# Patient Record
Sex: FEMALE | Race: WHITE | NOT HISPANIC OR LATINO
[De-identification: names, ages, dates, MRNs, and addresses within clinical notes are randomized per-mention and may not be internally consistent; named-entity substitution may affect disease eponyms.]

---

## 2017-12-07 ENCOUNTER — APPOINTMENT (OUTPATIENT)
Dept: OBGYN | Facility: CLINIC | Age: 62
End: 2017-12-07
Payer: COMMERCIAL

## 2017-12-07 VITALS
DIASTOLIC BLOOD PRESSURE: 76 MMHG | HEIGHT: 63 IN | WEIGHT: 164 LBS | SYSTOLIC BLOOD PRESSURE: 126 MMHG | BODY MASS INDEX: 29.06 KG/M2

## 2017-12-07 LAB
DATE COLLECTED: NORMAL
HEMOCCULT SP1 STL QL: NEGATIVE
QUALITY CONTROL: YES

## 2017-12-07 PROCEDURE — 82270 OCCULT BLOOD FECES: CPT

## 2017-12-07 PROCEDURE — 99396 PREV VISIT EST AGE 40-64: CPT

## 2017-12-07 PROCEDURE — 99214 OFFICE O/P EST MOD 30 MIN: CPT | Mod: 25

## 2017-12-07 PROCEDURE — 36415 COLL VENOUS BLD VENIPUNCTURE: CPT

## 2017-12-11 LAB
CYTOLOGY CVX/VAG DOC THIN PREP: NORMAL
HPV HIGH+LOW RISK DNA PNL CVX: NOT DETECTED

## 2017-12-29 DIAGNOSIS — Z13.71 ENCOUNTER FOR NONPROCREATIVE SCREENING FOR GENETIC DISEASE CARRIER STATUS: ICD-10-CM

## 2018-12-13 ENCOUNTER — APPOINTMENT (OUTPATIENT)
Dept: OBGYN | Facility: CLINIC | Age: 63
End: 2018-12-13
Payer: COMMERCIAL

## 2018-12-13 VITALS
SYSTOLIC BLOOD PRESSURE: 120 MMHG | WEIGHT: 160 LBS | DIASTOLIC BLOOD PRESSURE: 70 MMHG | HEIGHT: 63 IN | BODY MASS INDEX: 28.35 KG/M2

## 2018-12-13 LAB
DATE COLLECTED: NORMAL
HEMOCCULT SP1 STL QL: NEGATIVE
QUALITY CONTROL: YES

## 2018-12-13 PROCEDURE — 82270 OCCULT BLOOD FECES: CPT

## 2018-12-13 PROCEDURE — 99396 PREV VISIT EST AGE 40-64: CPT

## 2018-12-13 RX ORDER — METHYLPREDNISOLONE 4 MG/1
4 TABLET ORAL
Qty: 21 | Refills: 0 | Status: ACTIVE | COMMUNITY
Start: 2018-07-06

## 2018-12-13 RX ORDER — BETAMETHASONE DIPROPIONATE 0.5 MG/G
0.05 OINTMENT, AUGMENTED TOPICAL
Qty: 50 | Refills: 0 | Status: ACTIVE | COMMUNITY
Start: 2018-06-23

## 2018-12-13 RX ORDER — LOSARTAN POTASSIUM 50 MG/1
50 TABLET, FILM COATED ORAL
Qty: 14 | Refills: 0 | Status: ACTIVE | COMMUNITY
Start: 2018-11-27

## 2018-12-13 RX ORDER — METHYLPREDNISOLONE 4 MG/1
4 TABLET ORAL
Qty: 21 | Refills: 0 | Status: ACTIVE | COMMUNITY
Start: 2018-06-23

## 2018-12-18 LAB
CYTOLOGY CVX/VAG DOC THIN PREP: NORMAL
HPV HIGH+LOW RISK DNA PNL CVX: NOT DETECTED

## 2019-04-30 ENCOUNTER — OTHER (OUTPATIENT)
Age: 64
End: 2019-04-30

## 2019-12-19 ENCOUNTER — APPOINTMENT (OUTPATIENT)
Dept: OBGYN | Facility: CLINIC | Age: 64
End: 2019-12-19
Payer: COMMERCIAL

## 2019-12-19 VITALS
WEIGHT: 162 LBS | BODY MASS INDEX: 28.7 KG/M2 | DIASTOLIC BLOOD PRESSURE: 70 MMHG | SYSTOLIC BLOOD PRESSURE: 110 MMHG | HEIGHT: 63 IN

## 2019-12-19 LAB
DATE COLLECTED: NORMAL
HEMOCCULT SP1 STL QL: NEGATIVE
QUALITY CONTROL: YES

## 2019-12-19 PROCEDURE — 99396 PREV VISIT EST AGE 40-64: CPT

## 2019-12-19 PROCEDURE — 82270 OCCULT BLOOD FECES: CPT

## 2019-12-19 NOTE — PHYSICAL EXAM
[Awake] : awake [Alert] : alert [Soft] : soft [Oriented x3] : oriented to person, place, and time [Normal] : uterus [Labia Majora] : labia major [Labia Minora] : labia minora [Normal Position] : in a normal position [No Bleeding] : there was no active vaginal bleeding [Uterine Adnexae] : were not tender and not enlarged [No Tenderness] : no rectal tenderness [Acute Distress] : no acute distress [LAD] : no lymphadenopathy [Goiter] : no goiter [Thyroid Nodule] : no thyroid nodule [Mass] : no breast mass [Nipple Discharge] : no nipple discharge [Axillary LAD] : no axillary lymphadenopathy [Tender] : non tender [Distended] : not distended [H/Smegaly] : no hepatosplenomegaly [Depressed Mood] : not depressed [Flat Affect] : affect not flat [Tenderness] : nontender [Enlarged ___ wks] : not enlarged [Mass ___ cm] : no uterine mass was palpated [Adnexa Tenderness] : were not tender [Ovarian Mass (___ Cm)] : there were no adnexal masses [Occult Blood] : occult blood test from digital rectal exam was negative [de-identified] : breast exam: supine and upright

## 2019-12-24 LAB
CYTOLOGY CVX/VAG DOC THIN PREP: ABNORMAL
HPV HIGH+LOW RISK DNA PNL CVX: NOT DETECTED

## 2021-03-01 ENCOUNTER — APPOINTMENT (OUTPATIENT)
Dept: OBGYN | Facility: CLINIC | Age: 66
End: 2021-03-01
Payer: COMMERCIAL

## 2021-03-01 VITALS
WEIGHT: 161 LBS | SYSTOLIC BLOOD PRESSURE: 122 MMHG | TEMPERATURE: 97.3 F | HEIGHT: 63 IN | DIASTOLIC BLOOD PRESSURE: 72 MMHG | BODY MASS INDEX: 28.53 KG/M2

## 2021-03-01 LAB
DATE COLLECTED: NORMAL
HEMOCCULT SP1 STL QL: NEGATIVE
QUALITY CONTROL: YES

## 2021-03-01 PROCEDURE — 99396 PREV VISIT EST AGE 40-64: CPT

## 2021-03-01 PROCEDURE — 99072 ADDL SUPL MATRL&STAF TM PHE: CPT

## 2021-03-01 PROCEDURE — 82270 OCCULT BLOOD FECES: CPT

## 2021-03-01 NOTE — PHYSICAL EXAM
[Awake] : awake [Alert] : alert [Soft] : soft [Oriented x3] : oriented to person, place, and time [Normal] : uterus [Labia Majora] : labia major [Labia Minora] : labia minora [No Bleeding] : there was no active vaginal bleeding [Normal Position] : in a normal position [Uterine Adnexae] : were not tender and not enlarged [No Tenderness] : no rectal tenderness [Acute Distress] : no acute distress [LAD] : no lymphadenopathy [Thyroid Nodule] : no thyroid nodule [Goiter] : no goiter [Mass] : no breast mass [Nipple Discharge] : no nipple discharge [Axillary LAD] : no axillary lymphadenopathy [Tender] : non tender [Distended] : not distended [H/Smegaly] : no hepatosplenomegaly [Depressed Mood] : not depressed [Flat Affect] : affect not flat [Tenderness] : nontender [Enlarged ___ wks] : not enlarged [Mass ___ cm] : no uterine mass was palpated [Adnexa Tenderness] : were not tender [Ovarian Mass (___ Cm)] : there were no adnexal masses [Occult Blood] : occult blood test from digital rectal exam was negative [de-identified] : breast exam: supine and upright

## 2021-03-04 LAB
CYTOLOGY CVX/VAG DOC THIN PREP: ABNORMAL
HPV HIGH+LOW RISK DNA PNL CVX: NOT DETECTED

## 2021-08-18 ENCOUNTER — NON-APPOINTMENT (OUTPATIENT)
Age: 66
End: 2021-08-18

## 2022-03-16 ENCOUNTER — APPOINTMENT (OUTPATIENT)
Dept: OBGYN | Facility: CLINIC | Age: 67
End: 2022-03-16

## 2022-04-07 ENCOUNTER — APPOINTMENT (OUTPATIENT)
Dept: OBGYN | Facility: CLINIC | Age: 67
End: 2022-04-07
Payer: MEDICARE

## 2022-04-07 VITALS
DIASTOLIC BLOOD PRESSURE: 70 MMHG | BODY MASS INDEX: 28.7 KG/M2 | SYSTOLIC BLOOD PRESSURE: 114 MMHG | WEIGHT: 162 LBS | HEIGHT: 63 IN

## 2022-04-07 DIAGNOSIS — Z12.11 ENCOUNTER FOR SCREENING FOR MALIGNANT NEOPLASM OF COLON: ICD-10-CM

## 2022-04-07 DIAGNOSIS — R92.2 INCONCLUSIVE MAMMOGRAM: ICD-10-CM

## 2022-04-07 LAB
DATE COLLECTED: NORMAL
HEMOCCULT SP1 STL QL: NEGATIVE
QUALITY CONTROL: YES

## 2022-04-07 PROCEDURE — 82270 OCCULT BLOOD FECES: CPT

## 2022-04-07 PROCEDURE — G0101: CPT

## 2022-04-07 NOTE — PHYSICAL EXAM
[Chaperone Present] : A chaperone was present in the examining room during all aspects of the physical examination [Appropriately responsive] : appropriately responsive [Alert] : alert [No Acute Distress] : no acute distress [No Lymphadenopathy] : no lymphadenopathy [Regular Rate Rhythm] : regular rate rhythm [No Murmurs] : no murmurs [Clear to Auscultation B/L] : clear to auscultation bilaterally [Soft] : soft [Non-tender] : non-tender [Non-distended] : non-distended [No HSM] : No HSM [No Lesions] : no lesions [No Mass] : no mass [Oriented x3] : oriented x3 [Examination Of The Breasts] : a normal appearance [No Masses] : no breast masses were palpable [Labia Majora] : normal [Labia Minora] : normal [Atrophy] : atrophy [Normal] : normal [Uterine Adnexae] : normal [No Tenderness] : no tenderness [FreeTextEntry1] : SELWYN MARQUES.

## 2022-04-07 NOTE — DISCUSSION/SUMMARY
[FreeTextEntry1] : The benefits of adequate calcium intake and a daily multivitamin along with routine daily cardiovascular exercise were reviewed with the patient.\par  The patient was informed regarding the benefits of a YEARLY screening FOR SKIN CANCER \par  The importance of safer-sex was discussed with the patient.\par We reviewed ASCCP/ACOG guidelines for pap smears. \par  The patient was informed regarding the benefits of a screening colonoscopy.\par Rectal Exam: no rectal tenderness and occult blood test from digital rectal exam was negative. \par STOOL GUAIAC\par LOT [id number], EXP 10/30/23, DEV. LOT [id number], EXP 01/2025\par - STD BLOOD DECLINE\par FLU VACCINE 10/2021\par ALL QUESTIONS ANSWERED\par DISCUSSED PRECAUTIONS AGAINST COVID19, INCLUDING MASK WEARING, SOCIAL DISTANCING AND HAND WASHING.\par VACCINATED X 3. ( PFIZER )\par

## 2022-04-07 NOTE — HISTORY OF PRESENT ILLNESS
[FreeTextEntry1] : 66 yo WF G0  has one adopted daughter   \par \par PM\par FH of breast ca   COLOR genetics neg\par PMH:  HTN\par           MS\par         hematuria\par \par \par  \par  [Mammogramdate] : 2021 [PapSmeardate] : 2021 [BoneDensityDate] : 2020

## 2022-09-07 ENCOUNTER — APPOINTMENT (OUTPATIENT)
Dept: OBGYN | Facility: CLINIC | Age: 67
End: 2022-09-07

## 2022-09-07 VITALS
BODY MASS INDEX: 28.7 KG/M2 | WEIGHT: 162 LBS | SYSTOLIC BLOOD PRESSURE: 130 MMHG | DIASTOLIC BLOOD PRESSURE: 80 MMHG | HEIGHT: 63 IN

## 2022-09-07 DIAGNOSIS — Z13.820 ENCOUNTER FOR SCREENING FOR OSTEOPOROSIS: ICD-10-CM

## 2022-09-07 PROCEDURE — 36415 COLL VENOUS BLD VENIPUNCTURE: CPT

## 2022-09-07 PROCEDURE — 99213 OFFICE O/P EST LOW 20 MIN: CPT

## 2022-09-07 NOTE — DISCUSSION/SUMMARY
[FreeTextEntry1] : DEXA RESULT FROM 8/11/2022\par SPINE T SCORE -1.1\par FEMORAL NECK T SCORE 0.4 \par OSTEOPENIA \par Results of bone density reviewed with patient. We discussed diet, exercise, calcium, vitamin D, smoking cessation, family history. Fall precautions given. Discussed anti-resorptive agents, hormones and hormone agonists and expectant management.\par Encouraged pt to consume 1200 mg. of calcium daily, in foods, or in supplements. If using supplements, correct use of same reviewed. Advised pt to get her Vit D level checked, and to take at least 1000 iu of Vit D3 daily in the interim; discussed importance of weight bearing and resistance exercise, 5 times weekly for 30 minutes, and finally safety was discussed.\par All medicinal treatment possibilities are reviewed with pt\par will do blood work and adjust Vit D as necessary\par Fall precautions given.  Discussed balance, gait, visual health and visibility.  Discussed shoes, assistance with walking, environmental awareness. \par 23 MINUTES SPENT BY THE PROVIDER, INCLUSIVE OF ALL ISSUES RELATED TO THIS ENCOUNTER ON THE DATE OF SERVICE. \par  ALL QUESTIONS ANSWERED\par DISCUSSED PRECAUTIONS AGAINST COVID19, INCLUDING MASK WEARING, SOCIAL DISTANCING AND HAND WASHING.\par

## 2022-09-08 LAB — 25(OH)D3 SERPL-MCNC: 38 NG/ML

## 2022-09-26 ENCOUNTER — APPOINTMENT (OUTPATIENT)
Dept: OBGYN | Facility: CLINIC | Age: 67
End: 2022-09-26

## 2022-09-26 VITALS
BODY MASS INDEX: 28.7 KG/M2 | SYSTOLIC BLOOD PRESSURE: 130 MMHG | DIASTOLIC BLOOD PRESSURE: 70 MMHG | WEIGHT: 162 LBS | HEIGHT: 63 IN

## 2022-09-26 DIAGNOSIS — N63.0 UNSPECIFIED LUMP IN UNSPECIFIED BREAST: ICD-10-CM

## 2022-09-26 PROCEDURE — 99213 OFFICE O/P EST LOW 20 MIN: CPT

## 2022-09-26 NOTE — COUNSELING
[Nutrition/ Exercise/ Weight Management] : nutrition, exercise, weight management [Vitamins/Supplements] : vitamins/supplements [Contraception/ Emergency Contraception/ Safe Sexual Practices] : contraception, emergency contraception, safe sexual practices [Vaccines] : vaccines

## 2022-09-26 NOTE — DISCUSSION/SUMMARY
[FreeTextEntry1] : B/L BREAST EXAM WNL\par THERE IS A 2CM SUPERFICIAL MASS ON THE CHEST 6 CM FROM THE STERNAL NUTCH BETWEEN B/L BREAST\par EREMA 8/11/2022 BIRADS 2 BENIGN\par F/U WITH PCP AND DERM FOR THE SUPERFICIAL CHEST LUMP\par 20 MINUTES SPENT BY THE PROVIDER, INCLUSIVE OF ALL ISSUES RELATED TO THIS ENCOUNTER ON THE DATE OF SERVICE. \par  ALL QUESTIONS ANSWERED\par DISCUSSED PRECAUTIONS AGAINST COVID19, INCLUDING MASK WEARING, SOCIAL DISTANCING AND HAND WASHING.\par

## 2022-09-29 ENCOUNTER — NON-APPOINTMENT (OUTPATIENT)
Age: 67
End: 2022-09-29

## 2023-04-17 ENCOUNTER — NON-APPOINTMENT (OUTPATIENT)
Age: 68
End: 2023-04-17

## 2023-04-17 ENCOUNTER — APPOINTMENT (OUTPATIENT)
Dept: OBGYN | Facility: CLINIC | Age: 68
End: 2023-04-17
Payer: MEDICARE

## 2023-04-17 VITALS
BODY MASS INDEX: 29.23 KG/M2 | WEIGHT: 165 LBS | DIASTOLIC BLOOD PRESSURE: 70 MMHG | HEIGHT: 63 IN | SYSTOLIC BLOOD PRESSURE: 130 MMHG

## 2023-04-17 PROCEDURE — 99214 OFFICE O/P EST MOD 30 MIN: CPT

## 2023-04-17 NOTE — PHYSICAL EXAM
[Appropriately responsive] : appropriately responsive [Alert] : alert [No Acute Distress] : no acute distress [Soft] : soft [Non-tender] : non-tender [Non-distended] : non-distended [No HSM] : No HSM [No Lesions] : no lesions [No Mass] : no mass [Oriented x3] : oriented x3 [Examination Of The Breasts] : a normal appearance [No Masses] : no breast masses were palpable [Vulvar Atrophy] : vulvar atrophy [Labia Majora] : normal [Labia Minora] : normal [Normal] : normal [Uterine Adnexae] : normal [Exam Deferred] : was deferred

## 2023-04-17 NOTE — PLAN
[FreeTextEntry1] : BREAST NODULE ON LEFT, INCIDENTAL FINDING ON BREAST SONOGRAM 9/2022.  NO PHYSICAL EXAM FINDINGS.  RX FOR F/U SONOGRAM GIVEN.\par \par OSTEOPENIA AT HIPS, EXERCISE, SUPPLEMENTS, FALL PRECAUTIONS DISCUSSED.  FOR F/U 2024.\par \par

## 2023-04-17 NOTE — HISTORY OF PRESENT ILLNESS
[TextBox_4] : ANNUAL [Mammogramdate] : 9/26/22 [TextBox_19] : BR 2 [BreastSonogramDate] : 9/27/2022 [TextBox_25] : BR 3 - NEW NODULE LEFT [PapSmeardate] : 3/1/21 [TextBox_31] : ATROPHIC / NEGATIVE [BoneDensityDate] : 8/11/22 [TextBox_37] : OSTEOPENIA [ColonoscopyDate] : 3/2023 [LMPDate] : 30 YRS AGO [TextBox_6] : 30 YRS AGO [FreeTextEntry1] : 30 YRS AGO [No] : Patient does not have concerns regarding sex

## 2023-09-18 ENCOUNTER — NON-APPOINTMENT (OUTPATIENT)
Age: 68
End: 2023-09-18

## 2024-05-09 ENCOUNTER — APPOINTMENT (OUTPATIENT)
Dept: OBGYN | Facility: CLINIC | Age: 69
End: 2024-05-09
Payer: MEDICARE

## 2024-05-09 VITALS
DIASTOLIC BLOOD PRESSURE: 70 MMHG | BODY MASS INDEX: 29.41 KG/M2 | WEIGHT: 166 LBS | SYSTOLIC BLOOD PRESSURE: 120 MMHG | HEIGHT: 63 IN

## 2024-05-09 DIAGNOSIS — N63.0 UNSPECIFIED LUMP IN UNSPECIFIED BREAST: ICD-10-CM

## 2024-05-09 DIAGNOSIS — R10.84 GENERALIZED ABDOMINAL PAIN: ICD-10-CM

## 2024-05-09 DIAGNOSIS — D21.9 BENIGN NEOPLASM OF CONNECTIVE AND OTHER SOFT TISSUE, UNSPECIFIED: ICD-10-CM

## 2024-05-09 DIAGNOSIS — Z12.31 ENCOUNTER FOR SCREENING MAMMOGRAM FOR MALIGNANT NEOPLASM OF BREAST: ICD-10-CM

## 2024-05-09 DIAGNOSIS — M85.80 OTHER SPECIFIED DISORDERS OF BONE DENSITY AND STRUCTURE, UNSPECIFIED SITE: ICD-10-CM

## 2024-05-09 DIAGNOSIS — N95.2 POSTMENOPAUSAL ATROPHIC VAGINITIS: ICD-10-CM

## 2024-05-09 DIAGNOSIS — Z12.4 ENCOUNTER FOR SCREENING FOR MALIGNANT NEOPLASM OF CERVIX: ICD-10-CM

## 2024-05-09 LAB
CARD LOT #: NORMAL
CARD LOT EXP DATE: NORMAL
DATE COLLECTED: NORMAL
DEVELOPER LOT #: NORMAL
DEVELOPER LOT EXP DATE: NORMAL
HEMOCCULT SP1 STL QL: NEGATIVE
QUALITY CONTROL: YES

## 2024-05-09 PROCEDURE — 99459 PELVIC EXAMINATION: CPT

## 2024-05-09 PROCEDURE — 99214 OFFICE O/P EST MOD 30 MIN: CPT

## 2024-05-09 PROCEDURE — 82270 OCCULT BLOOD FECES: CPT

## 2024-05-09 NOTE — HISTORY OF PRESENT ILLNESS
[FreeTextEntry1] : TRAVELING TO [place] TO VISIT NIECE'S 5 MONTH OLD TWINS.  BREAST SCREENING INTERVALS REVIEWED, BASED ON EVIDENCE BASED RECOMMENDATIONS FOR BREAST CANCER IN THE [place] FOR Patient's AGE, PERSONAL AND FAMILY CANCER HISTORIES.  DISCUSSED UTILITY OF BREAST MAMMOGRAM, SONOGRAM AND MRI.   RECOMMENDED APPROPRIATE BREAST TESTING. BREAST NODULE, GET SONOGRAM WITH MAMMOGRAM.  MANAGEMENT OF PAP SMEAR AND D/W PATIENT.  OPTIONS INCLUDE YEARLY PAP VS. Q 3 YEARS.  RISKS OF OVER-TREATMENT OF BENIGN DISEASE VERSUS "MISSING" CERVICAL DISEASE DISCUSSED.  OSTEOPENIA, FOR F/U DEXA 8/2024.  FIBROID UTERUS, FOR F/U SONOGRAM EVALUATION.  DENIES PMB.  NO URINARY COMPLAINTS.  [Mammogramdate] : 8/29/23 [TextBox_19] : br 2 [BreastSonogramDate] : 9/18/23 [TextBox_25] : br 2 [PapSmeardate] : 3/1/21 [TextBox_31] : atrophic  [BoneDensityDate] : 8/11/22 [TextBox_37] : osteopenia  [ColonoscopyDate] : 3/2023 [LMPDate] : 30 yrs ago [Previously active] : previously active [No] : No [FreeTextEntry2] :

## 2024-05-09 NOTE — PHYSICAL EXAM
[Chaperone Present] : A chaperone was present in the examining room during all aspects of the physical examination [[id number]] : A chaperone was present during the pelvic exam. [FreeTextEntry2] : JERALD BAIN [Appropriately responsive] : appropriately responsive [Alert] : alert [No Acute Distress] : no acute distress [Soft] : soft [Non-tender] : non-tender [Non-distended] : non-distended [No HSM] : No HSM [No Lesions] : no lesions [No Mass] : no mass [Oriented x3] : oriented x3 [Examination Of The Breasts] : a normal appearance [No Masses] : no breast masses were palpable [Vulvar Atrophy] : vulvar atrophy [Atrophy] : atrophy [Normal] : normal [Uterine Adnexae] : non-palpable [No Tenderness] : no tenderness [Nl Sphincter Tone] : normal sphincter tone [FreeTextEntry9] : GUAIAC NEGATIVE

## 2024-05-09 NOTE — PLAN
[FreeTextEntry1] : OSTEOPENIA, CONTINUE EXERCISE AND F/U DEXA 8/2024. VAGINAL ATROPHY, ASYMPTOMATIC, FOR VAGINAL E2 IF CHANGE IN SYMPTOMS. BREAST NODULE ON SONOGRAM, HISTORY OF BREAST BIOPSY, NORMAL EXAM, FOR MAMMO/SONO 8/2024.

## 2024-05-13 LAB — CYTOLOGY CVX/VAG DOC THIN PREP: ABNORMAL

## 2024-09-10 ENCOUNTER — NON-APPOINTMENT (OUTPATIENT)
Age: 69
End: 2024-09-10

## 2025-02-12 ENCOUNTER — OFFICE (OUTPATIENT)
Dept: URBAN - METROPOLITAN AREA CLINIC 1 | Facility: CLINIC | Age: 70
Setting detail: OPHTHALMOLOGY
End: 2025-02-12
Payer: MEDICARE

## 2025-02-12 DIAGNOSIS — H25.013: ICD-10-CM

## 2025-02-12 DIAGNOSIS — H25.13: ICD-10-CM

## 2025-02-12 PROCEDURE — 99204 OFFICE O/P NEW MOD 45 MIN: CPT | Performed by: OPHTHALMOLOGY

## 2025-02-12 ASSESSMENT — REFRACTION_MANIFEST
OS_SPHERE: +0.75
OD_SPHERE: +1.75
OD_VA1: 20/50-2
OS_AXIS: 030
OD_CYLINDER: -1.50
OS_CYLINDER: -1.00
OD_AXIS: 090
OS_VA1: 20/40-2

## 2025-02-12 ASSESSMENT — KERATOMETRY
OD_K1POWER_DIOPTERS: 44.75
OS_K2POWER_DIOPTERS: 46.75
OS_K1POWER_DIOPTERS: 45.00
OD_K2POWER_DIOPTERS: 45.00
OS_AXISANGLE_DEGREES: 101
OD_AXISANGLE_DEGREES: 035

## 2025-02-12 ASSESSMENT — REFRACTION_AUTOREFRACTION
OS_SPHERE: +0.75
OS_CYLINDER: -1.00
OD_SPHERE: +1.75
OD_CYLINDER: -1.50
OD_AXIS: 092
OS_AXIS: 029

## 2025-02-12 ASSESSMENT — REFRACTION_CURRENTRX
OS_AXIS: 119
OS_VPRISM_DIRECTION: SV
OS_SPHERE: +2.50
OS_OVR_VA: 20/
OD_SPHERE: +2.50
OD_VPRISM_DIRECTION: SV
OS_CYLINDER: -0.50
OD_OVR_VA: 20/

## 2025-02-12 ASSESSMENT — TONOMETRY
OD_IOP_MMHG: 17
OS_IOP_MMHG: 16

## 2025-02-12 ASSESSMENT — VISUAL ACUITY
OD_BCVA: 20/25-2
OS_BCVA: 20/40

## 2025-02-12 ASSESSMENT — CONFRONTATIONAL VISUAL FIELD TEST (CVF)
OS_FINDINGS: FULL
OD_FINDINGS: FULL

## 2025-03-05 ENCOUNTER — OFFICE (OUTPATIENT)
Dept: URBAN - METROPOLITAN AREA CLINIC 6 | Facility: CLINIC | Age: 70
Setting detail: OPHTHALMOLOGY
End: 2025-03-05
Payer: MEDICARE

## 2025-03-05 DIAGNOSIS — H25.13: ICD-10-CM

## 2025-03-05 DIAGNOSIS — H25.11: ICD-10-CM

## 2025-03-05 PROCEDURE — 92136 OPHTHALMIC BIOMETRY: CPT | Mod: TC | Performed by: OPHTHALMOLOGY

## 2025-03-05 PROCEDURE — 92136 OPHTHALMIC BIOMETRY: CPT | Mod: 26,RT | Performed by: OPHTHALMOLOGY

## 2025-03-05 PROCEDURE — 99213 OFFICE O/P EST LOW 20 MIN: CPT | Performed by: OPHTHALMOLOGY

## 2025-03-05 ASSESSMENT — REFRACTION_MANIFEST
OS_CYLINDER: -1.00
OS_AXIS: 030
OS_VA1: 20/40-2
OD_VA1: 20/50-2
OS_SPHERE: +0.75
OD_SPHERE: +1.75
OD_CYLINDER: -1.50
OD_AXIS: 090

## 2025-03-05 ASSESSMENT — VISUAL ACUITY
OD_BCVA: 20/30-1
OS_BCVA: 20/40-1

## 2025-03-05 ASSESSMENT — REFRACTION_CURRENTRX
OS_CYLINDER: -0.50
OS_AXIS: 119
OS_SPHERE: +2.50
OD_SPHERE: +2.50
OD_VPRISM_DIRECTION: SV
OD_OVR_VA: 20/
OS_VPRISM_DIRECTION: SV
OS_OVR_VA: 20/

## 2025-03-05 ASSESSMENT — KERATOMETRY
OS_K1POWER_DIOPTERS: 45.00
OD_AXISANGLE_DEGREES: 019
OD_K1POWER_DIOPTERS: 44.25
OD_K2POWER_DIOPTERS: 45.00
OS_K2POWER_DIOPTERS: 46.00
OS_AXISANGLE_DEGREES: 099

## 2025-03-05 ASSESSMENT — REFRACTION_AUTOREFRACTION
OS_SPHERE: +1.00
OD_AXIS: 106
OS_AXIS: 027
OD_SPHERE: +0.75
OS_CYLINDER: -0.50
OD_CYLINDER: -1.75

## 2025-03-05 ASSESSMENT — CONFRONTATIONAL VISUAL FIELD TEST (CVF)
OS_FINDINGS: FULL
OD_FINDINGS: FULL

## 2025-03-05 ASSESSMENT — TONOMETRY
OD_IOP_MMHG: 15
OS_IOP_MMHG: 15

## 2025-03-21 ENCOUNTER — AMBULATORY SURGERY CENTER (OUTPATIENT)
Dept: URBAN - METROPOLITAN AREA SURGERY 4 | Facility: SURGERY | Age: 70
Setting detail: OPHTHALMOLOGY
End: 2025-03-21
Payer: MEDICARE

## 2025-03-21 DIAGNOSIS — H25.11: ICD-10-CM

## 2025-03-21 PROCEDURE — 68841 INSJ RX ELUT IMPLT LAC CANAL: CPT | Mod: RT | Performed by: OPHTHALMOLOGY

## 2025-03-21 PROCEDURE — 66984 XCAPSL CTRC RMVL W/O ECP: CPT | Mod: RT | Performed by: OPHTHALMOLOGY

## 2025-03-22 ENCOUNTER — RX ONLY (RX ONLY)
Age: 70
End: 2025-03-22

## 2025-03-22 ENCOUNTER — OFFICE (OUTPATIENT)
Dept: URBAN - METROPOLITAN AREA CLINIC 1 | Facility: CLINIC | Age: 70
Setting detail: OPHTHALMOLOGY
End: 2025-03-22
Payer: MEDICARE

## 2025-03-22 DIAGNOSIS — Z96.1: ICD-10-CM

## 2025-03-22 PROCEDURE — 99024 POSTOP FOLLOW-UP VISIT: CPT

## 2025-03-22 ASSESSMENT — REFRACTION_CURRENTRX
OS_SPHERE: +2.50
OS_VPRISM_DIRECTION: SV
OS_CYLINDER: -0.50
OD_SPHERE: +2.50
OS_AXIS: 119
OD_VPRISM_DIRECTION: SV
OS_OVR_VA: 20/
OD_OVR_VA: 20/

## 2025-03-22 ASSESSMENT — CORNEAL EDEMA - MICROCYSTIC EPITHELIAL EDEMA (MCE): OD_MCE: T

## 2025-03-22 ASSESSMENT — REFRACTION_AUTOREFRACTION
OS_AXIS: 036
OD_SPHERE: +0.25
OD_AXIS: 094
OS_CYLINDER: -0.75
OS_SPHERE: +1.00
OD_CYLINDER: -1.00

## 2025-03-22 ASSESSMENT — REFRACTION_MANIFEST
OS_VA1: 20/40-2
OD_AXIS: 090
OD_CYLINDER: -1.50
OD_VA1: 20/50-2
OS_CYLINDER: -1.00
OS_SPHERE: +0.75
OD_SPHERE: +1.75
OS_AXIS: 030

## 2025-03-22 ASSESSMENT — VISUAL ACUITY
OS_BCVA: 20/25
OD_BCVA: 20/30-2

## 2025-03-22 ASSESSMENT — KERATOMETRY
OD_K1POWER_DIOPTERS: 44.50
OD_AXISANGLE_DEGREES: 018
OS_K1POWER_DIOPTERS: 45.00
OS_AXISANGLE_DEGREES: 102
OS_K2POWER_DIOPTERS: 46.25
OD_K2POWER_DIOPTERS: 45.00

## 2025-03-22 ASSESSMENT — CONFRONTATIONAL VISUAL FIELD TEST (CVF)
OS_FINDINGS: FULL
OD_FINDINGS: FULL

## 2025-03-22 ASSESSMENT — TONOMETRY
OD_IOP_MMHG: 19
OS_IOP_MMHG: 16

## 2025-03-25 ENCOUNTER — OFFICE (OUTPATIENT)
Dept: URBAN - METROPOLITAN AREA CLINIC 6 | Facility: CLINIC | Age: 70
Setting detail: OPHTHALMOLOGY
End: 2025-03-25
Payer: MEDICARE

## 2025-03-25 DIAGNOSIS — H25.12: ICD-10-CM

## 2025-03-25 PROBLEM — H25.012 CORTICAL AGE RELATED CATARACT; LEFT EYE: Status: ACTIVE | Noted: 2025-03-25

## 2025-03-25 PROBLEM — Z96.1 PSEUDOPHAKIA: Status: ACTIVE | Noted: 2025-03-22

## 2025-03-25 PROCEDURE — 92136 OPHTHALMIC BIOMETRY: CPT | Mod: 26,LT | Performed by: OPHTHALMOLOGY

## 2025-03-25 ASSESSMENT — REFRACTION_CURRENTRX
OS_SPHERE: +2.50
OS_OVR_VA: 20/
OS_AXIS: 119
OS_VPRISM_DIRECTION: SV
OD_OVR_VA: 20/
OS_CYLINDER: -0.50
OD_SPHERE: +2.50
OD_VPRISM_DIRECTION: SV

## 2025-03-25 ASSESSMENT — TONOMETRY
OD_IOP_MMHG: 14
OS_IOP_MMHG: 15

## 2025-03-25 ASSESSMENT — CONFRONTATIONAL VISUAL FIELD TEST (CVF)
OS_FINDINGS: FULL
OD_FINDINGS: FULL

## 2025-03-25 ASSESSMENT — VISUAL ACUITY
OD_BCVA: 20/30-1
OS_BCVA: 20/20

## 2025-03-25 ASSESSMENT — CORNEAL EDEMA - MICROCYSTIC EPITHELIAL EDEMA (MCE): OD_MCE: T

## 2025-03-25 ASSESSMENT — REFRACTION_MANIFEST
OS_SPHERE: +0.75
OS_AXIS: 030
OD_AXIS: 090
OD_SPHERE: +1.75
OS_VA1: 20/40-2
OD_VA1: 20/50-2
OS_CYLINDER: -1.00
OD_CYLINDER: -1.50

## 2025-03-25 ASSESSMENT — REFRACTION_AUTOREFRACTION
OD_CYLINDER: -0.75
OS_CYLINDER: -0.75
OS_SPHERE: +0.75
OD_SPHERE: +0.25
OS_AXIS: 018
OD_AXIS: 102

## 2025-03-25 ASSESSMENT — KERATOMETRY
OS_K1POWER_DIOPTERS: 45.00
OD_K1POWER_DIOPTERS: 44.50
OS_K2POWER_DIOPTERS: 46.25
OD_AXISANGLE_DEGREES: 018
OS_AXISANGLE_DEGREES: 102
OD_K2POWER_DIOPTERS: 45.00

## 2025-04-03 ENCOUNTER — ASC (OUTPATIENT)
Dept: URBAN - METROPOLITAN AREA SURGERY 8 | Facility: SURGERY | Age: 70
Setting detail: OPHTHALMOLOGY
End: 2025-04-03
Payer: MEDICARE

## 2025-04-03 DIAGNOSIS — H57.03: ICD-10-CM

## 2025-04-03 DIAGNOSIS — H25.12: ICD-10-CM

## 2025-04-03 PROCEDURE — 68841 INSJ RX ELUT IMPLT LAC CANAL: CPT | Mod: 79,LT | Performed by: OPHTHALMOLOGY

## 2025-04-03 PROCEDURE — 66982 XCAPSL CTRC RMVL CPLX WO ECP: CPT | Mod: 79,LT | Performed by: OPHTHALMOLOGY

## 2025-04-04 ENCOUNTER — OFFICE (OUTPATIENT)
Dept: URBAN - METROPOLITAN AREA CLINIC 1 | Facility: CLINIC | Age: 70
Setting detail: OPHTHALMOLOGY
End: 2025-04-04
Payer: MEDICARE

## 2025-04-04 DIAGNOSIS — Z96.1: ICD-10-CM

## 2025-04-04 PROCEDURE — 99024 POSTOP FOLLOW-UP VISIT: CPT

## 2025-04-04 ASSESSMENT — REFRACTION_CURRENTRX
OS_CYLINDER: -0.50
OD_OVR_VA: 20/
OD_VPRISM_DIRECTION: SV
OS_AXIS: 119
OS_OVR_VA: 20/
OS_SPHERE: +2.50
OS_VPRISM_DIRECTION: SV
OD_SPHERE: +2.50

## 2025-04-04 ASSESSMENT — KERATOMETRY
OD_K1POWER_DIOPTERS: 44.75
OD_AXISANGLE_DEGREES: 079
OS_K2POWER_DIOPTERS: 46.50
OS_K1POWER_DIOPTERS: 44.75
OS_AXISANGLE_DEGREES: 096
OD_K2POWER_DIOPTERS: 45.00

## 2025-04-04 ASSESSMENT — REFRACTION_AUTOREFRACTION
OD_SPHERE: PLANO
OS_CYLINDER: -2.00
OD_AXIS: 098
OS_SPHERE: +0.50
OS_AXIS: 005
OD_CYLINDER: -0.50

## 2025-04-04 ASSESSMENT — CONFRONTATIONAL VISUAL FIELD TEST (CVF)
OS_FINDINGS: FULL
OD_FINDINGS: FULL

## 2025-04-04 ASSESSMENT — VISUAL ACUITY
OD_BCVA: 20/20-2
OS_BCVA: 20/20-1

## 2025-04-04 ASSESSMENT — TONOMETRY
OD_IOP_MMHG: 17
OS_IOP_MMHG: 18

## 2025-04-11 ENCOUNTER — OFFICE (OUTPATIENT)
Dept: URBAN - METROPOLITAN AREA CLINIC 1 | Facility: CLINIC | Age: 70
Setting detail: OPHTHALMOLOGY
End: 2025-04-11
Payer: MEDICARE

## 2025-04-11 DIAGNOSIS — Z96.1: ICD-10-CM

## 2025-04-11 PROCEDURE — 99024 POSTOP FOLLOW-UP VISIT: CPT

## 2025-04-11 ASSESSMENT — VISUAL ACUITY
OD_BCVA: 20/20-
OS_BCVA: 20/20-

## 2025-04-11 ASSESSMENT — TONOMETRY
OS_IOP_MMHG: 17
OD_IOP_MMHG: 17

## 2025-04-11 ASSESSMENT — REFRACTION_CURRENTRX
OS_CYLINDER: -0.50
OS_VPRISM_DIRECTION: SV
OS_AXIS: 119
OD_VPRISM_DIRECTION: SV
OS_SPHERE: +2.50
OD_OVR_VA: 20/
OS_OVR_VA: 20/
OD_SPHERE: +2.50

## 2025-04-11 ASSESSMENT — CONFRONTATIONAL VISUAL FIELD TEST (CVF)
OS_FINDINGS: FULL
OD_FINDINGS: FULL

## 2025-04-11 ASSESSMENT — REFRACTION_AUTOREFRACTION
OS_SPHERE: +0.50
OD_CYLINDER: -1.00
OS_CYLINDER: -1.00
OD_SPHERE: +0.25
OD_AXIS: 101
OS_AXIS: 031

## 2025-04-11 ASSESSMENT — KERATOMETRY
OS_AXISANGLE_DEGREES: 103
OD_K1POWER_DIOPTERS: 44.75
OS_K1POWER_DIOPTERS: 44.75
OD_K2POWER_DIOPTERS: 45.00
OS_K2POWER_DIOPTERS: 46.25
OD_AXISANGLE_DEGREES: 014

## 2025-04-29 ENCOUNTER — OFFICE (OUTPATIENT)
Dept: URBAN - METROPOLITAN AREA CLINIC 1 | Facility: CLINIC | Age: 70
Setting detail: OPHTHALMOLOGY
End: 2025-04-29
Payer: MEDICARE

## 2025-04-29 DIAGNOSIS — H16.223: ICD-10-CM

## 2025-04-29 DIAGNOSIS — Z96.1: ICD-10-CM

## 2025-04-29 PROCEDURE — 99024 POSTOP FOLLOW-UP VISIT: CPT

## 2025-04-29 ASSESSMENT — REFRACTION_MANIFEST
OD_SPHERE: PLANO
OS_SPHERE: PLANO
OD_CYLINDER: -0.50
OD_AXIS: 110
OD_VA1: 20/20
OS_AXIS: 15
OS_CYLINDER: -0.50
OS_VA1: 20/20
OS_ADD: +2.50
OD_ADD: +2.50

## 2025-04-29 ASSESSMENT — REFRACTION_CURRENTRX
OD_VPRISM_DIRECTION: SV
OS_VPRISM_DIRECTION: SV
OS_SPHERE: +2.50
OD_OVR_VA: 20/
OS_OVR_VA: 20/
OD_SPHERE: +2.50

## 2025-04-29 ASSESSMENT — KERATOMETRY
OS_K2POWER_DIOPTERS: 46.50
OD_AXISANGLE_DEGREES: 157
OD_K1POWER_DIOPTERS: 44.75
OD_K2POWER_DIOPTERS: 45.50
METHOD_AUTO_MANUAL: AUTO
OS_K1POWER_DIOPTERS: 44.75
OS_AXISANGLE_DEGREES: 014

## 2025-04-29 ASSESSMENT — SUPERFICIAL PUNCTATE KERATITIS (SPK)
OD_SPK: T
OS_SPK: T

## 2025-04-29 ASSESSMENT — REFRACTION_AUTOREFRACTION
OS_CYLINDER: -3.00
OD_CYLINDER: -0.50
OS_SPHERE: +0.50
OS_AXIS: 010
OD_AXIS: 110
OD_SPHERE: PLANO

## 2025-04-29 ASSESSMENT — TONOMETRY
OS_IOP_MMHG: 17
OD_IOP_MMHG: 17

## 2025-04-29 ASSESSMENT — CONFRONTATIONAL VISUAL FIELD TEST (CVF)
OS_FINDINGS: FULL
OD_FINDINGS: FULL

## 2025-04-29 ASSESSMENT — VISUAL ACUITY
OD_BCVA: 20/30-1
OS_BCVA: 20/20-1

## 2025-08-12 ENCOUNTER — OFFICE (OUTPATIENT)
Dept: URBAN - METROPOLITAN AREA CLINIC 1 | Facility: CLINIC | Age: 70
Setting detail: OPHTHALMOLOGY
End: 2025-08-12
Payer: MEDICARE

## 2025-08-12 DIAGNOSIS — Z96.1: ICD-10-CM

## 2025-08-12 DIAGNOSIS — H43.812: ICD-10-CM

## 2025-08-12 DIAGNOSIS — H16.223: ICD-10-CM

## 2025-08-12 DIAGNOSIS — H26.493: ICD-10-CM

## 2025-08-12 PROCEDURE — 92014 COMPRE OPH EXAM EST PT 1/>: CPT

## 2025-08-12 ASSESSMENT — SUPERFICIAL PUNCTATE KERATITIS (SPK)
OD_SPK: T
OS_SPK: T

## 2025-08-12 ASSESSMENT — REFRACTION_CURRENTRX
OD_OVR_VA: 20/
OS_VPRISM_DIRECTION: SV
OD_SPHERE: +2.50
OS_OVR_VA: 20/
OS_SPHERE: +2.50
OD_VPRISM_DIRECTION: SV

## 2025-08-12 ASSESSMENT — KERATOMETRY
OD_K2POWER_DIOPTERS: 45.00
OS_K1POWER_DIOPTERS: 45.00
OD_AXISANGLE_DEGREES: 122
METHOD_AUTO_MANUAL: AUTO
OS_AXISANGLE_DEGREES: 015
OD_K1POWER_DIOPTERS: 44.75
OS_K2POWER_DIOPTERS: 46.25

## 2025-08-12 ASSESSMENT — REFRACTION_AUTOREFRACTION
OD_SPHERE: +0.50
OS_CYLINDER: -0.75
OS_SPHERE: +0.50
OD_AXIS: 100
OD_CYLINDER: -1.25
OS_AXIS: 045

## 2025-08-12 ASSESSMENT — REFRACTION_MANIFEST
OD_SPHERE: PLANO
OS_ADD: +2.50
OS_VA1: 20/20
OS_CYLINDER: -0.50
OS_SPHERE: PLANO
OD_VA1: 20/20
OD_AXIS: 110
OD_CYLINDER: -0.50
OD_ADD: +2.50
OS_AXIS: 15

## 2025-08-12 ASSESSMENT — TONOMETRY
OS_IOP_MMHG: 14
OD_IOP_MMHG: 14

## 2025-08-12 ASSESSMENT — VISUAL ACUITY
OS_BCVA: 20/25-1
OD_BCVA: 20/25-1

## 2025-08-12 ASSESSMENT — CONFRONTATIONAL VISUAL FIELD TEST (CVF)
OS_FINDINGS: FULL
OD_FINDINGS: FULL